# Patient Record
Sex: MALE | Race: WHITE
[De-identification: names, ages, dates, MRNs, and addresses within clinical notes are randomized per-mention and may not be internally consistent; named-entity substitution may affect disease eponyms.]

---

## 2017-06-15 ENCOUNTER — HOSPITAL ENCOUNTER (EMERGENCY)
Dept: HOSPITAL 25 - ED | Age: 62
Discharge: HOME | End: 2017-06-15
Payer: COMMERCIAL

## 2017-06-15 VITALS — DIASTOLIC BLOOD PRESSURE: 77 MMHG | SYSTOLIC BLOOD PRESSURE: 143 MMHG

## 2017-06-15 DIAGNOSIS — K62.89: Primary | ICD-10-CM

## 2017-06-15 DIAGNOSIS — R42: ICD-10-CM

## 2017-06-15 DIAGNOSIS — Z88.1: ICD-10-CM

## 2017-06-15 DIAGNOSIS — R10.84: ICD-10-CM

## 2017-06-15 DIAGNOSIS — K59.00: ICD-10-CM

## 2017-06-15 DIAGNOSIS — I10: ICD-10-CM

## 2017-06-15 DIAGNOSIS — Z88.0: ICD-10-CM

## 2017-06-15 DIAGNOSIS — R50.9: ICD-10-CM

## 2017-06-15 LAB
ALBUMIN SERPL BCG-MCNC: 4.4 G/DL (ref 3.2–5.2)
ALP SERPL-CCNC: 64 U/L (ref 34–104)
ALT SERPL W P-5'-P-CCNC: 34 U/L (ref 7–52)
AMYLASE SERPL-CCNC: 32 U/L (ref 29–103)
ANION GAP SERPL CALC-SCNC: 11 MMOL/L (ref 2–11)
AST SERPL-CCNC: 28 U/L (ref 13–39)
BUN SERPL-MCNC: 18 MG/DL (ref 6–24)
BUN/CREAT SERPL: 16.7 (ref 8–20)
CALCIUM SERPL-MCNC: 9.5 MG/DL (ref 8.6–10.3)
CHLORIDE SERPL-SCNC: 104 MMOL/L (ref 101–111)
GLOBULIN SER CALC-MCNC: 2.6 G/DL (ref 2–4)
GLUCOSE SERPL-MCNC: 105 MG/DL (ref 70–100)
HCO3 SERPL-SCNC: 21 MMOL/L (ref 22–32)
HCT VFR BLD AUTO: 46 % (ref 42–52)
HGB BLD-MCNC: 15.6 G/DL (ref 14–18)
LIPASE SERPL-CCNC: 19 U/L (ref 11–82)
MCH RBC QN AUTO: 31 PG (ref 27–31)
MCHC RBC AUTO-ENTMCNC: 34 G/DL (ref 31–36)
MCV RBC AUTO: 90 FL (ref 80–94)
POTASSIUM SERPL-SCNC: 3.5 MMOL/L (ref 3.5–5)
PROT SERPL-MCNC: 7 G/DL (ref 6.4–8.9)
RBC # BLD AUTO: 5.11 10^6/UL (ref 4–5.4)
SODIUM SERPL-SCNC: 136 MMOL/L (ref 133–145)
WBC # BLD AUTO: 18.9 10^3/UL (ref 3.5–10.8)

## 2017-06-15 PROCEDURE — 74177 CT ABD & PELVIS W/CONTRAST: CPT

## 2017-06-15 PROCEDURE — 87040 BLOOD CULTURE FOR BACTERIA: CPT

## 2017-06-15 PROCEDURE — 82270 OCCULT BLOOD FECES: CPT

## 2017-06-15 PROCEDURE — 81003 URINALYSIS AUTO W/O SCOPE: CPT

## 2017-06-15 PROCEDURE — 83605 ASSAY OF LACTIC ACID: CPT

## 2017-06-15 PROCEDURE — 83690 ASSAY OF LIPASE: CPT

## 2017-06-15 PROCEDURE — 36415 COLL VENOUS BLD VENIPUNCTURE: CPT

## 2017-06-15 PROCEDURE — 93005 ELECTROCARDIOGRAM TRACING: CPT

## 2017-06-15 PROCEDURE — 86140 C-REACTIVE PROTEIN: CPT

## 2017-06-15 PROCEDURE — 82150 ASSAY OF AMYLASE: CPT

## 2017-06-15 PROCEDURE — 85025 COMPLETE CBC W/AUTO DIFF WBC: CPT

## 2017-06-15 PROCEDURE — 96360 HYDRATION IV INFUSION INIT: CPT

## 2017-06-15 PROCEDURE — 99283 EMERGENCY DEPT VISIT LOW MDM: CPT

## 2017-06-15 PROCEDURE — 80053 COMPREHEN METABOLIC PANEL: CPT

## 2017-06-15 NOTE — RAD
INDICATION: Lower abdominal pain.



COMPARISON: May 12, 2017 RIGHT upper quadrant ultrasound. March 06, 2009 CT.



TECHNIQUE: Multidetector CT images were obtained from the lung bases to the ischial

tuberosities with 119 mL Omnipaque 300 IV and oral contrast.  Multiplanar reformation.



REPORT: Unremarkable visualized inferior thorax.



Decreased density of the liver consistent with fatty infiltration with focal sparing at

the gallbladder fossa. Negative for suspicious focal hepatic lesions. No CT abnormality of

the gallbladder, pancreas, spleen.



Negative for CT abnormality of the upper GI or small bowel. No appendix visualized.

Negative for RIGHT lower quadrant inflammatory change. Enteric contrast extends to the

descending colon. Minimal diverticulosis of the distal sigmoid colon without findings of

diverticulitis. Suggestion of mild bilateral lateral and posterior mural thickening at the

rectum and mild reticulation in the ischiorectal fat compared with the prior exam. 

Negative for ascites, free air, or significant hernias.



Normal adrenal glands. Unremarkable kidneys with symmetric nephrograms and pyelograms.

Unremarkable ureters and distended urinary bladder. Symmetric seminal vesicles. Coarse

calcification at the prostate.



Negative for lymphadenopathy. Normal diameter abdominal aorta and iliac arteries with mild

calcific plaque of the abdominal aorta. Physiologic distention of the IVC.



Degenerative spondylosis and facet joint osteoarthritis. Associated vacuum disc phenomenon

at L2-L3. No significant acquired spinal stenosis evident. No suspicious focal osseous

lesions evident.



IMPRESSION: 

1. Normal size liver with hepatosteatosis.

2. While the appendix is not discretely visualized, there is no inflammatory change in the

right lower quadrant or region of the tip of the cecum to suggest presence of an acute

inflammatory process.

3. Minimal diverticulosis of the sigmoid colon without findings of diverticulitis.

4. Suggestion of mild bilateral lateral and posterior mural thickening at the rectum and

mild reticulation in the ischiorectal fat compared with the prior exam. Correlate for

potential proctitis.

5. Negative for lymphadenopathy.

## 2017-06-16 NOTE — ED
Hien SAMUELS Edward, scribed for Rodney Soler MD on 06/15/17 at 0718 .





Abdominal Pain/Male





- HPI Summary


HPI Summary: 





62 y/o male presents to ED with abdominal pain. The pain began at 03:00 this 

morning and is characterized as diffuse, aching pain. Associated sx: 

constipation, fever (currently 101.9), cold sweats at 03:00, dizziness, feeling 

lightheaded, and pain in the middle of the buttocks. Patient stated small bowel 

movements yesterday. PMHx dehydration and constipation. Patient is a delivery 

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








- History of Current Complaint


Stated Complaint: ABD PAIN / CHILLS


Hx Obtained From: Patient


Onset/Duration: Sudden Onset, Still Present


Timing: Constant


Severity Initially: Moderate


Severity Currently: Moderate


Pain Intensity: 8


Pain Scale Used: 0-10 Numeric


Location: Diffuse - Over abdomen


Character: Other: - Ache


Associated Signs And Symptoms: Positive: Diaphoresis - Cold sweats, Fever - 

101.9, Dizzy, Constipation - Small bowel movements yesterday, Other - 

Lightheaded





- Allergies/Home Medications


Allergies/Adverse Reactions: 


 Allergies











Allergy/AdvReac Type Severity Reaction Status Date / Time


 


Cephalexin AdvReac  Difficulty Verified 06/15/17 07:52





   Breathing  


 


Penicillin G AdvReac  Difficulty Verified 06/15/17 07:52





   Breathing/Wheezing  











Home Medications: 


 Home Medications





Allegra-D 24 Hour Allergy 180-240 mg 180 mg PO DAILY 06/15/17 [History 

Confirmed 06/15/17]


Omeprazole CAP* [Prilosec CAP* 20 MG] 20 mg PO DAILY 06/15/17 [History 

Confirmed 06/15/17]











PMH/Surg Hx/FS Hx/Imm Hx


Previously Healthy: Yes


Endocrine/Hematology History: 


   Denies: Hx Diabetes


Cardiovascular History: Reports: Hx Hypertension


   Denies: Hx Pacemaker/ICD


Respiratory History: 


   Denies: Hx Asthma


Sensory History: 


   Denies: Hx Hearing Aid


Psychiatric History: 


   Denies: Hx Panic Disorder





- Surgical History


Surgery Procedure, Year, and Place: LEFT SHOULDER RTC/BIG TOE FX





- Family History


Known Family History: Positive: Other - Negative MI


   Negative: Hypertension, Diabetes





- Social History


Occupation: Employed Full-time


Lives: With Family





Review of Systems


Positive: Fever - 101.9, Chills, Skin Diaphoresis - Cold sweats


Eyes: Negative


ENT: Negative


Cardiovascular: Negative


Respiratory: Negative


Positive: Abdominal Pain - Diffuse aching, Other - Constipation (small bowel 

movements yesterday)


Genitourinary: Negative


Positive: Other - Pain in middle of buttocks


Skin: Negative


Neurological: Other - Dizziness, lightheaded


Psychological: Normal


All Other Systems Reviewed And Are Negative: Yes





Physical Exam





- Summary


Physical Exam Summary: 





VITAL SIGNS:~Reviewed.


GENERAL:~Patient is a well-developed and nourished male who is lying 

comfortable in the stretcher.~ Patient is not in any acute respiratory distress.


HEAD AND FACE:~Normocephalic and atraumatic.


EYES:~PERRLA, EOMI x 2, No injected conjunctiva.


EARS:~Hearing grossly intact. Ear canals and tympanic membranes are WNL.


MOUTH:~Oropharynx within normal limits.


NECK:~Supple, trachea is midline, no adenopathy, no JVD.


CHEST:~Symmetric, no tenderness at palpation


LUNGS:~Clear to auscultation bilaterally. No wheezing or crackles.


CVS:~RRR, S1 and S2 present, no murmurs or gallops appreciated.


ABDOMEN:~Soft. Lower abdominal tenderness. No signs of distention. Positive 

bowel sounds. No rebound no guarding, and no masses palpated. No abdominal 

bruit or pulsations.


EXTREMITIES:~FROM in all major joints, no edema, no cyanosis or clubbing.


NEURO:~Alert and oriented x 3. No acute neurological deficits. Speech is normal.


SKIN:~Dry and warm





RECTAL EXAM: (07:37) No stool, normal sphincter tone, no hemorrhoids, no blood 

or mucous


Triage Information Reviewed: Yes


Vital Signs On Initial Exam: 


 Initial Vitals











Temp Pulse Resp BP Pulse Ox


 


 101.9 F   102   20   126/65   97 


 


 06/15/17 07:15  06/15/17 07:15  06/15/17 07:15  06/15/17 07:15  06/15/17 07:15











Vital Signs Reviewed: Yes





Diagnostics





- Vital Signs


 Vital Signs











  Temp Pulse Resp BP Pulse Ox


 


 06/15/17 12:16  98.3 F  97  18  143/77 


 


 06/15/17 12:00   96  20  143/77  97


 


 06/15/17 11:30   97  16  102/70  97


 


 06/15/17 11:00   90  21  108/63  96


 


 06/15/17 10:09     113/66 


 


 06/15/17 10:07    16  


 


 06/15/17 09:30   91  19  110/68  96


 


 06/15/17 09:00   92  21  117/66  95


 


 06/15/17 08:30   89  23  127/60  97


 


 06/15/17 08:00   93  18  132/71  97


 


 06/15/17 07:30     100/57 


 


 06/15/17 07:28   79  17   98


 


 06/15/17 07:26     124/69 


 


 06/15/17 07:19  101.9 F  97  20  126/65  97


 


 06/15/17 07:15  101.9 F  102  20  126/65  97














- Laboratory


Lab Results: 


 Lab Results











  06/15/17 06/15/17 06/15/17 Range/Units





  07:50 07:50 07:50 


 


WBC  18.9 H    (3.5-10.8)  10^3/ul


 


RBC  5.11    (4.0-5.4)  10^6/ul


 


Hgb  15.6    (14.0-18.0)  g/dl


 


Hct  46    (42-52)  %


 


MCV  90    (80-94)  fL


 


MCH  31    (27-31)  pg


 


MCHC  34    (31-36)  g/dl


 


RDW  13    (10.5-15)  %


 


Plt Count  216    (150-450)  10^3/ul


 


MPV  9    (7.4-10.4)  um3


 


Neut % (Auto)  93.9 H    (38-83)  %


 


Lymph % (Auto)  3.1 L    (25-47)  %


 


Mono % (Auto)  2.4    (1-9)  %


 


Eos % (Auto)  0    (0-6)  %


 


Baso % (Auto)  0.6    (0-2)  %


 


Absolute Neuts (auto)  17.7 H    (1.5-7.7)  10^3/ul


 


Absolute Lymphs (auto)  0.6 L    (1.0-4.8)  10^3/ul


 


Absolute Monos (auto)  0.5    (0-0.8)  10^3/ul


 


Absolute Eos (auto)  0    (0-0.6)  10^3/ul


 


Absolute Basos (auto)  0.1    (0-0.2)  10^3/ul


 


Absolute Nucleated RBC  0.02    10^3/ul


 


Nucleated RBC %  0.1    


 


Sodium   136   (133-145)  mmol/L


 


Potassium   3.5   (3.5-5.0)  mmol/L


 


Chloride   104   (101-111)  mmol/L


 


Carbon Dioxide   21 L   (22-32)  mmol/L


 


Anion Gap   11   (2-11)  mmol/L


 


BUN   18   (6-24)  mg/dL


 


Creatinine   1.08   (0.67-1.17)  mg/dL


 


Est GFR ( Amer)   90.0   (>60)  


 


Est GFR (Non-Af Amer)   70.0   (>60)  


 


BUN/Creatinine Ratio   16.7   (8-20)  


 


Glucose   105 H   ()  mg/dL


 


Lactic Acid    2.6 H*  (0.5-2.0)  mmol/L


 


Calcium   9.5   (8.6-10.3)  mg/dL


 


Total Bilirubin   0.60   (0.2-1.0)  mg/dL


 


AST   28   (13-39)  U/L


 


ALT   34   (7-52)  U/L


 


Alkaline Phosphatase   64   ()  U/L


 


C-Reactive Protein   6.21 H   (< 5.00)  mg/L


 


Total Protein   7.0   (6.4-8.9)  g/dL


 


Albumin   4.4   (3.2-5.2)  g/dL


 


Globulin   2.6   (2-4)  g/dL


 


Albumin/Globulin Ratio   1.7   (1-3)  


 


Amylase   32   ()  U/L


 


Lipase   19   (11.0-82.0)  U/L


 


Urine Color     


 


Urine Appearance     


 


Urine pH     (5-9)  


 


Ur Specific Gravity     (1.010-1.030)  


 


Urine Protein     (Negative)  


 


Urine Ketones     (Negative)  


 


Urine Blood     (Negative)  


 


Urine Nitrate     (Negative)  


 


Urine Bilirubin     (Negative)  


 


Urine Urobilinogen     (Negative)  


 


Ur Leukocyte Esterase     (Negative)  


 


Urine Glucose     (Negative)  














  06/15/17 Range/Units





  10:41 


 


WBC   (3.5-10.8)  10^3/ul


 


RBC   (4.0-5.4)  10^6/ul


 


Hgb   (14.0-18.0)  g/dl


 


Hct   (42-52)  %


 


MCV   (80-94)  fL


 


MCH   (27-31)  pg


 


MCHC   (31-36)  g/dl


 


RDW   (10.5-15)  %


 


Plt Count   (150-450)  10^3/ul


 


MPV   (7.4-10.4)  um3


 


Neut % (Auto)   (38-83)  %


 


Lymph % (Auto)   (25-47)  %


 


Mono % (Auto)   (1-9)  %


 


Eos % (Auto)   (0-6)  %


 


Baso % (Auto)   (0-2)  %


 


Absolute Neuts (auto)   (1.5-7.7)  10^3/ul


 


Absolute Lymphs (auto)   (1.0-4.8)  10^3/ul


 


Absolute Monos (auto)   (0-0.8)  10^3/ul


 


Absolute Eos (auto)   (0-0.6)  10^3/ul


 


Absolute Basos (auto)   (0-0.2)  10^3/ul


 


Absolute Nucleated RBC   10^3/ul


 


Nucleated RBC %   


 


Sodium   (133-145)  mmol/L


 


Potassium   (3.5-5.0)  mmol/L


 


Chloride   (101-111)  mmol/L


 


Carbon Dioxide   (22-32)  mmol/L


 


Anion Gap   (2-11)  mmol/L


 


BUN   (6-24)  mg/dL


 


Creatinine   (0.67-1.17)  mg/dL


 


Est GFR ( Amer)   (>60)  


 


Est GFR (Non-Af Amer)   (>60)  


 


BUN/Creatinine Ratio   (8-20)  


 


Glucose   ()  mg/dL


 


Lactic Acid   (0.5-2.0)  mmol/L


 


Calcium   (8.6-10.3)  mg/dL


 


Total Bilirubin   (0.2-1.0)  mg/dL


 


AST   (13-39)  U/L


 


ALT   (7-52)  U/L


 


Alkaline Phosphatase   ()  U/L


 


C-Reactive Protein   (< 5.00)  mg/L


 


Total Protein   (6.4-8.9)  g/dL


 


Albumin   (3.2-5.2)  g/dL


 


Globulin   (2-4)  g/dL


 


Albumin/Globulin Ratio   (1-3)  


 


Amylase   ()  U/L


 


Lipase   (11.0-82.0)  U/L


 


Urine Color  Yellow  


 


Urine Appearance  Clear  


 


Urine pH  6.0  (5-9)  


 


Ur Specific Gravity  1.055 H  (1.010-1.030)  


 


Urine Protein  Negative  (Negative)  


 


Urine Ketones  Negative  (Negative)  


 


Urine Blood  Negative  (Negative)  


 


Urine Nitrate  Negative  (Negative)  


 


Urine Bilirubin  Negative  (Negative)  


 


Urine Urobilinogen  Negative  (Negative)  


 


Ur Leukocyte Esterase  Negative  (Negative)  


 


Urine Glucose  Negative  (Negative)  











Result Diagrams: 


 06/15/17 07:50





 06/15/17 07:50


Lab Statement: Any lab studies that have been ordered have been reviewed, and 

results considered in the medical decision making process.





- CT


  ** CT Abd/Pelvis


CT Interpretation: Positive (See Comments) - 1. Normal size liver with 

hepatosteatosis. 2. While the appendix is not discretely visualized, there is 

no inflammatory change in the right lower quadrant or region of the tip of the 

cecum to suggest presence of an acute inflammatory process. 3. Minimal 

diverticulosis of the sigmoid colon without findings of diverticulitis. 4. 

Suggestion of mild bilateral lateral and posterior mural thickening at the 

rectum and mild reticulation in the ischiorectal fat compared with the prior 

exam. Correlate for potential proctitis. 5. Negative for lymphadenopathy.


CT Interpretation Completed By: Radiologist





- EKG


  ** 1


EKG Interpretation: 08:08 - Sinus rhythm @ 96 bpm. No ST elevations





Abdominal Pain Fem Course/Dx





- Course


Assessment/Plan: 62 y/o male presents to ED with abdominal pain. The pain began 

at 03:00 this morning and is characterized as diffuse, aching pain. Associated 

sx: constipation, fever (currently 101.9), cold sweats at 03:00, dizziness, 

feeling lightheaded, and pain in the middle of the buttocks. Patient stated 

small bowel movements yesterday. PMHx dehydration and constipation. Patient is 

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  Tests show WBC 18.9 w/

o bands, Glucose 105, Lactic acid 2.6, CRP 6.2. UA (-) UTI. ABD/Pelvis CT shows 

1. Normal size liver with hepatosteatosis. 2. While the appendix is not 

discretely visualized, there is no inflammatory change in the right lower 

quadrant or region of the tip of the cecum to suggest presence of an acute 

inflammatory process. 3. Minimal diverticulosis of the sigmoid colon without 

findings of diverticulitis. 4. Suggestion of mild bilateral lateral and 

posterior mural thickening at the rectum and mild reticulation in the 

ischiorectal fat compared with the prior exam. Correlate for potential 

proctitis. 5. Negative for lymphadenopathy. In the ED course, the patient was 

given IV fluids, ciprofloxacin and flagyl. Pt was given Tylenol for fever, and 

sx improved. I discussed the case with Dr. Jang from GI, who agrees with Abx 

management. Pt was discharged home with F/U at his office, patient should call 

for apt. Patient is feeling better and requested pain medications to go home, 

so he was given Percocet. However, he was told that Percocet will make him more 

constipated. The pt understand and agrees, and will increase water intake. The 

patient was instructed to return to ED if Pt experiences fever, abdominal pain,

  or n/v. The patient understand and agrees.





- Diagnoses


Differential Diagnosis/HQI/PQRI: Appendicitis, Bowel Obstruction, Constipation, 

Testicular Torsion, Ureteral Stone, Urinary Tract Infection


Provider Diagnoses: 


 Proctitis








- Provider Notifications


Discussed Care Of Patient With: Phan Jang


Time Discussed With Above Provider: 11:16


Instructed by Provider To: Other - Follow up at his office





Discharge





- Discharge Plan


Condition: Stable


Disposition: HOME


Prescriptions: 


Ciprofloxacin TAB* [Cipro 500 MG TAB*] 500 mg PO BID #20 tab


Metronidazole [Flagyl 500 MG TAB] 500 mg PO TID #30 tab


oxyCODONE/Acetamin 5/325 MG* [Percocet 5/325 TAB*] 1 tab PO Q6H PRN #10 tab MDD 

4 tabs/ day


 PRN Reason: Pain


Patient Education Materials:  Proctitis (ED)


Forms:  *Gen. Provider Communication, *Work Release


Referrals: 


Phan Jang MD [Medical Doctor] - As Soon As Possible (Follow up at 

office. Call for an appt.)


Dante Burnett MD [Primary Care Provider] - 3 Days (Follow up in 2-3 days.)





The documentation as recorded by the Hien tidwell Edward accurately reflects the 

service I personally performed and the decisions made by me, Rodney Soler MD.

## 2019-04-11 ENCOUNTER — HOSPITAL ENCOUNTER (EMERGENCY)
Dept: HOSPITAL 25 - UCEAST | Age: 64
Discharge: HOME | End: 2019-04-11
Payer: COMMERCIAL

## 2019-04-11 VITALS — SYSTOLIC BLOOD PRESSURE: 160 MMHG | DIASTOLIC BLOOD PRESSURE: 97 MMHG

## 2019-04-11 DIAGNOSIS — M54.12: Primary | ICD-10-CM

## 2019-04-11 DIAGNOSIS — E04.1: ICD-10-CM

## 2019-04-11 DIAGNOSIS — I10: ICD-10-CM

## 2019-04-11 DIAGNOSIS — Z88.0: ICD-10-CM

## 2019-04-11 DIAGNOSIS — Z88.1: ICD-10-CM

## 2019-04-11 PROCEDURE — G0463 HOSPITAL OUTPT CLINIC VISIT: HCPCS

## 2019-04-11 PROCEDURE — 71046 X-RAY EXAM CHEST 2 VIEWS: CPT

## 2019-04-11 PROCEDURE — 99211 OFF/OP EST MAY X REQ PHY/QHP: CPT

## 2019-04-11 NOTE — ED
Neck Pain





- HPI Summary


HPI Summary: 





Mr. Jain presents with pain in the right side of his neck which is chronic 

but worsened recently.  He had a recent US of a thyroid nodule which is OK.  He 

has had MR of his neck and a recent PT evaluation which recommended PT.  He has 

developed an occasional difficulty clearing his throat.





- History of Current Complaint


Chief Complaint: UCGeneralIllness


Stated Complaint: NECK PAIN


Time Seen by Provider: 04/11/19 16:27


Hx Obtained From: Patient, Family/Caretaker


Onset/Duration Of Injury/Symptoms: Months


Timing: Constant


Onset/Duration: Gradual Onset


Severity Initially: Mild


Severity Currently: Moderate


Pain Intensity: 7


Character: Dull, Aching


Aggravating Factors: Nothing


Alleviating Factors: Nothing


Associated Signs & Symptoms: Positive: Negative


Related History: Previous Neck Injury





- Allergies/Home Medications


Allergies/Adverse Reactions: 


 Allergies











Allergy/AdvReac Type Severity Reaction Status Date / Time


 


cephalexin Allergy  Rapid Verified 04/11/19 15:39





   heart beat  


 


Penicillins Allergy  Rash Verified 04/11/19 15:39














PMH/Surg Hx/FS Hx/Imm Hx


Previously Healthy: Yes


Endocrine/Hematology History: Reports: Hx Thyroid Disease - THYROID NODULE


   Denies: Hx Diabetes


Cardiovascular History: Reports: Hx Hypertension


   Denies: Hx Pacemaker/ICD


Respiratory History: 


   Denies: Hx Asthma


 History: 


   Denies: Hx Renal Disease


Sensory History: 


   Denies: Hx Hearing Aid


Psychiatric History: 


   Denies: Hx Panic Disorder





- Surgical History


Surgery Procedure, Year, and Place: LEFT SHOULDER RTC, BIG TOE FX


Infectious Disease History: No


Infectious Disease History: 


   Denies: Traveled Outside the [place] in Last 30 Days





- Family History


Known Family History: Positive: Other - Negative MI


   Negative: Hypertension, Diabetes





- Social History


Alcohol Use: None


Substance Use Type: Reports: None


Smoking Status (MU): Never Smoked Tobacco





Review of Systems


Constitutional: Negative


Eyes: Negative


Positive: Other - occasional difficulty with clearing his throat 


Cardiovascular: Negative


Respiratory: Negative


Neurological: Negative


All Other Systems Reviewed And Are Negative: Yes





Physical Exam





- Summary


Physical Exam Summary: 





He is non-toxic in appearance with stable vitals.


Triage Information Reviewed: Yes


Vital Signs On Initial Exam: 


 Initial Vitals











Temp Pulse Resp BP Pulse Ox


 


 98.1 F   66   16   160/97   99 


 


 04/11/19 15:32  04/11/19 15:32  04/11/19 15:32  04/11/19 15:32  04/11/19 15:32











Vital Signs Reviewed: Yes


Appearance: Positive: Well-Appearing


Skin: Positive: Warm


Head/Face: Positive: Normal Head/Face Inspection


Eyes: Positive: Normal


ENT: Positive: Normal ENT inspection


Neck: Positive: Supple, Nontender - He has a good carotid pulse with no bruit 

and it is nontender, No Lymphadenopathy


Respiratory/Lung Sounds: Positive: Clear to Auscultation


Musculoskeletal: Positive: Normal


Neurological: Positive: Normal





Diagnostics





- Vital Signs


 Vital Signs











  Temp Pulse Resp BP Pulse Ox


 


 04/11/19 15:32  98.1 F  66  16  160/97  99














- Laboratory


Lab Statement: Any lab studies that have been ordered have been reviewed, and 

results considered in the medical decision making process.





- Radiology


  ** CXR


Radiology Interpretation Completed By: ED Physician - No Acute Process





Neck Course/Dx





- Course


Course Of Treatment: I got a CXR just for the possibility of a recurrent 

laryngeal nerve irritation causing the choking but it looks OK.  I recommended 

that he keep his appt. with Dr. Ahumada on Tuesday.





- Diagnoses


Provider Diagnoses: 


 Cervical radiculopathy








Discharge





- Sign-Out/Discharge


Documenting (check all that apply): Patient Departure


All imaging exams completed and their final reports reviewed: Yes





- Discharge Plan


Condition: Stable


Disposition: HOME


Patient Education Materials:  Cervical Radiculopathy (ED)


Referrals: 


Yennifer Ahumada MD [Primary Care Provider] - 


Additional Instructions: 


Keep your appointment with Dr. Ahumada on Tuesday





- Billing Disposition and Condition


Condition: STABLE


Disposition: Home